# Patient Record
Sex: MALE | Race: WHITE | NOT HISPANIC OR LATINO | URBAN - METROPOLITAN AREA
[De-identification: names, ages, dates, MRNs, and addresses within clinical notes are randomized per-mention and may not be internally consistent; named-entity substitution may affect disease eponyms.]

---

## 2017-07-15 ENCOUNTER — OUTPATIENT (OUTPATIENT)
Dept: OUTPATIENT SERVICES | Facility: HOSPITAL | Age: 27
LOS: 1 days | Discharge: HOME | End: 2017-07-15

## 2017-07-15 DIAGNOSIS — E78.00 PURE HYPERCHOLESTEROLEMIA, UNSPECIFIED: ICD-10-CM

## 2017-07-15 DIAGNOSIS — I10 ESSENTIAL (PRIMARY) HYPERTENSION: ICD-10-CM

## 2017-07-15 DIAGNOSIS — D64.9 ANEMIA, UNSPECIFIED: ICD-10-CM

## 2017-07-15 DIAGNOSIS — E11.9 TYPE 2 DIABETES MELLITUS WITHOUT COMPLICATIONS: ICD-10-CM

## 2017-07-15 DIAGNOSIS — E78.5 HYPERLIPIDEMIA, UNSPECIFIED: ICD-10-CM

## 2018-03-01 ENCOUNTER — OUTPATIENT (OUTPATIENT)
Dept: OUTPATIENT SERVICES | Facility: HOSPITAL | Age: 28
LOS: 1 days | Discharge: HOME | End: 2018-03-01

## 2018-03-01 DIAGNOSIS — E66.09 OTHER OBESITY DUE TO EXCESS CALORIES: ICD-10-CM

## 2019-08-27 ENCOUNTER — OUTPATIENT (OUTPATIENT)
Dept: OUTPATIENT SERVICES | Facility: HOSPITAL | Age: 29
LOS: 1 days | Discharge: HOME | End: 2019-08-27

## 2019-08-27 DIAGNOSIS — E78.5 HYPERLIPIDEMIA, UNSPECIFIED: ICD-10-CM

## 2020-07-24 ENCOUNTER — EMERGENCY (EMERGENCY)
Facility: HOSPITAL | Age: 30
LOS: 0 days | Discharge: HOME | End: 2020-07-24
Attending: EMERGENCY MEDICINE | Admitting: EMERGENCY MEDICINE
Payer: COMMERCIAL

## 2020-07-24 VITALS
SYSTOLIC BLOOD PRESSURE: 128 MMHG | TEMPERATURE: 97 F | HEIGHT: 71 IN | OXYGEN SATURATION: 97 % | RESPIRATION RATE: 20 BRPM | DIASTOLIC BLOOD PRESSURE: 65 MMHG | HEART RATE: 64 BPM | WEIGHT: 229.94 LBS
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
## 2020-07-24 DIAGNOSIS — Z98.890 OTHER SPECIFIED POSTPROCEDURAL STATES: Chronic | ICD-10-CM
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
## 2020-07-24 DIAGNOSIS — M54.9 DORSALGIA, UNSPECIFIED: ICD-10-CM

## 2020-07-24 DIAGNOSIS — Z88.8 ALLERGY STATUS TO OTHER DRUGS, MEDICAMENTS AND BIOLOGICAL SUBSTANCES: ICD-10-CM
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
## 2020-07-24 DIAGNOSIS — M54.5 LOW BACK PAIN: ICD-10-CM

## 2020-07-24 PROCEDURE — 20552 NJX 1/MLT TRIGGER POINT 1/2: CPT

## 2020-07-24 PROCEDURE — 99284 EMERGENCY DEPT VISIT MOD MDM: CPT | Mod: 25

## 2020-07-24 NOTE — ED PROVIDER NOTE - NSFOLLOWUPCLINICS_GEN_ALL_ED_FT
[facility] ([facility])  Rehabilitation  [facility] [address], [address] [place] [address]  Phone: [phone]  Fax:   Follow Up Time:

## 2020-07-24 NOTE — ED PROVIDER NOTE - PATIENT PORTAL LINK FT
You can access the FollowMyHealth Patient Portal offered by [facility] by registering at the following website: [email or web address]. By joining [employer]’s FollowMyHealth portal, you will also be able to view your health information using other applications (apps) compatible with our system.

## 2020-07-24 NOTE — ED PROVIDER NOTE - ATTENDING CONTRIBUTION TO CARE
29yM recently diagnosed w/ sciatica p/w b/l low back pain.  Pt was seen in urgent care for same pain, had transient relief w/ motrin/robaxin but sx flared up again tonight.  Pt able to ambulate and has no fever, urinary retention/incontinence, IVDU, extremity numbness/weakness.

## 2020-07-24 NOTE — ED PROVIDER NOTE - CARE PROVIDER_API CALL
Graeme Michelle  NEUROSURGERY  [address] [place] [id number]  Phone: [phone]  Fax: [phone]  Follow Up Time:

## 2020-07-24 NOTE — ED PROVIDER NOTE - OBJECTIVE STATEMENT
29 year old male, no past medical history, who presents with b/l lower back pain that began today. Patient reports recent heavy lifting x3 days ago, gradual onset of b/l lower back pain radiating to b/l thighs. Worse with movement, alleviated with rest. Patient was seen at urgent care today, was given muscle relaxant/NSAIDs, states took one dose of each, but pain persisted so presented to ED. Denies hx IVDU, saddle anesthesias, urinary/bowel incontinence. Patient ambulating w/o difficulty.

## 2020-07-24 NOTE — ED PROVIDER NOTE - CLINICAL SUMMARY MEDICAL DECISION MAKING FREE TEXT BOX
29yM p/w low back pain, likely sciatica.  No red flags to necessitate imaging.  Pt treated w/ pain meds.  Ok to dc with o/p f/u, supportive care, return precautions.

## 2020-07-24 NOTE — ED PROVIDER NOTE - NS ED ROS FT
Review of Systems:  	•	CONSTITUTIONAL: no fever, no diaphoresis, no chills  	•	SKIN: no rash  	•	HEMATOLOGIC: no bleeding, no bruising  	•	RESPIRATORY: no shortness of breath, no cough  	•	CARDIAC: no chest pain, no palpitations  	•	GI: no abd pain, no nausea, no vomiting, no diarrhea  	•	GENITO-URINARY: no hematuria, no increased urinary frequency  	•	MUSCULOSKELETAL: + b/l lower back pain. no joint paint, no swelling, no redness  	•	NEUROLOGIC: no weakness, numbness, paresthesias. no saddle anesthesia/urinary or bowel incontinence   	•	PSYCH: no anxiety, non suicidal, non homicidal, no hallucination, no depression

## 2020-07-24 NOTE — ED PROCEDURE NOTE - GENERAL PROCEDURE DETAILS
area cleansed with alcohol prep. left lower back trigger point identified. 22G needle inserted and infiltrated 1cc of 1% lidocaine into the trigger point.

## 2020-07-24 NOTE — ED PROVIDER NOTE - PHYSICAL EXAMINATION
CONSTITUTIONAL: Well-developed; well-nourished; in no acute distress, nontoxic appearing  SKIN: skin exam is warm and dry,  HEAD: Normocephalic; atraumatic.  NECK: Normal ROM   CARD: S1, S2 normal, no murmur  RESP: No wheezes, rales or rhonchi. Good air movement bilaterally  ABD: soft; non-distended; non-tender.   EXT: +TTP paraspinal muscles overlying lumber region. No midline tenderness. Normal ROM.   NEURO: awake, alert, following commands, oriented, grossly unremarkable. No Focal deficits. GCS 15.   PSYCH: Cooperative, appropriate.

## 2020-07-24 NOTE — ED PROVIDER NOTE - PROGRESS NOTE DETAILS
patient given trigger point inject on L side, reports significant improvement of pain. Ambulating without difficulty. Educated on taking medication prescribed at urgent care and importance of follow up with pmd for possible MRI/further management of symptoms. Educated on s/s to be aware of that should prompt return to ER. Patient verbalizes understanding. GW: I personally evaluated the patient. I reviewed the Physician Assistant Fellow's note and agree with the findings and plan.

## 2020-10-07 NOTE — ED PROCEDURE NOTE - NS ED PROCEDURE ASSISTED BY
[facility] called and said they wanted Dr Doll to know the patient will no longer be going to the pain clinic.  The pain clinic also will not be filling any prescriptions for the patient.    Supervision was available

## 2021-10-26 NOTE — ED ADULT NURSE NOTE - NS ED NOTE  TALK SOMEONE YN
BRITTNEY made second attempt at outreach. UTR. BRITTNEY sent my chart message with guidelines and contact information.
No

## 2023-10-03 PROBLEM — Z00.00 ENCOUNTER FOR PREVENTIVE HEALTH EXAMINATION: Status: ACTIVE | Noted: 2023-10-03

## 2023-10-04 ENCOUNTER — APPOINTMENT (OUTPATIENT)
Dept: ENDOCRINOLOGY | Facility: CLINIC | Age: 33
End: 2023-10-04
Payer: COMMERCIAL

## 2023-10-04 VITALS
SYSTOLIC BLOOD PRESSURE: 130 MMHG | BODY MASS INDEX: 35 KG/M2 | WEIGHT: 250 LBS | HEIGHT: 71 IN | DIASTOLIC BLOOD PRESSURE: 82 MMHG | TEMPERATURE: 97.4 F | HEART RATE: 78 BPM | OXYGEN SATURATION: 98 %

## 2023-10-04 DIAGNOSIS — N62 HYPERTROPHY OF BREAST: ICD-10-CM

## 2023-10-04 DIAGNOSIS — Z80.1 FAMILY HISTORY OF MALIGNANT NEOPLASM OF TRACHEA, BRONCHUS AND LUNG: ICD-10-CM

## 2023-10-04 DIAGNOSIS — E66.9 OBESITY, UNSPECIFIED: ICD-10-CM

## 2023-10-04 DIAGNOSIS — Z78.9 OTHER SPECIFIED HEALTH STATUS: ICD-10-CM

## 2023-10-04 PROCEDURE — 99203 OFFICE O/P NEW LOW 30 MIN: CPT

## 2023-10-04 RX ORDER — ASCORBIC ACID 500 MG
TABLET ORAL
Refills: 0 | Status: ACTIVE | COMMUNITY

## 2023-10-04 RX ORDER — CHROMIUM 200 MCG
TABLET ORAL
Refills: 0 | Status: ACTIVE | COMMUNITY

## 2024-01-03 LAB
ACTH SER-ACNC: 45.4 PG/ML
CORTIS SERPL-MCNC: 13.1 UG/DL
ESTRADIOL SERPL-MCNC: 23 PG/ML
FSH SERPL-MCNC: 1.6 IU/L
HCG-TM SERPL-MCNC: <1 MIU/ML
LH SERPL-ACNC: 3.6 IU/L
PROLACTIN SERPL-MCNC: 8.8 NG/ML
SHBG SERPL-SCNC: 24.1 NMOL/L
T4 FREE SERPL-MCNC: 1.3 NG/DL
TESTOST FREE SERPL-MCNC: 13.9 PG/ML
TESTOST SERPL-MCNC: 502 NG/DL
TSH SERPL-ACNC: 2.23 UIU/ML

## 2024-09-06 ENCOUNTER — TRANSCRIPTION ENCOUNTER (OUTPATIENT)
Age: 34
End: 2024-09-06

## 2024-12-11 NOTE — ED ADULT TRIAGE NOTE - HEIGHT IN INCHES
SUMMARY: Admitted for evaluation of SOB.     DATE & TIME:12/10/24, 1930 - 0730  Cognitive Concerns/ Orientation : A&O x 4  BEHAVIOR & AGGRESSION TOOL COLOR: Green   ABNL VS/O2: VSS on O2 3 LPM via nasal with humidification   MOBILITY: Pivots to BSC.  Right AKA. Repositions self in bed.  PAIN MANAGMENT: Prn tylenol given for lower back pain   DIET: Cardiac, FR 1200 ml, Strict I&O  BOWEL/BLADDER: Intermittent incontinence, pure wick in place    ABNL LAB/BG: NA   DRAIN/DEVICES: PIV SL   SKIN: Dusky, blanchable redness to coccyx, mepilex on. Dressings to thoracentesis sites, CDI  TESTS/PROCEDURES: None- multiple thoracentesis while in hospital   D/C DATE: TBD pending improvement   Discharge Barriers: Creatinine and O2 need improvement.  OTHER IMPORTANT INFO: On scheduled nebs, refusing BIPAP and chest physiotherapy. Nephrology signed off and Pulmonology following.   11